# Patient Record
Sex: FEMALE | Race: WHITE | NOT HISPANIC OR LATINO | Employment: FULL TIME | URBAN - METROPOLITAN AREA
[De-identification: names, ages, dates, MRNs, and addresses within clinical notes are randomized per-mention and may not be internally consistent; named-entity substitution may affect disease eponyms.]

---

## 2019-11-04 ENCOUNTER — HISTORICAL (OUTPATIENT)
Dept: ADMINISTRATIVE | Facility: HOSPITAL | Age: 54
End: 2019-11-04

## 2019-11-06 LAB — FINAL CULTURE: NO GROWTH

## 2019-12-02 ENCOUNTER — HISTORICAL (OUTPATIENT)
Dept: ADMINISTRATIVE | Facility: HOSPITAL | Age: 54
End: 2019-12-02

## 2019-12-03 ENCOUNTER — HOSPITAL ENCOUNTER (OUTPATIENT)
Dept: MEDSURG UNIT | Facility: HOSPITAL | Age: 54
End: 2019-12-04
Attending: OBSTETRICS & GYNECOLOGY | Admitting: OBSTETRICS & GYNECOLOGY

## 2022-04-11 ENCOUNTER — HISTORICAL (OUTPATIENT)
Dept: ADMINISTRATIVE | Facility: HOSPITAL | Age: 57
End: 2022-04-11
Payer: MEDICAID

## 2022-04-25 DIAGNOSIS — R41.3 MEMORY CHANGE: Primary | ICD-10-CM

## 2022-04-28 VITALS
OXYGEN SATURATION: 98 % | WEIGHT: 190.25 LBS | BODY MASS INDEX: 30.58 KG/M2 | DIASTOLIC BLOOD PRESSURE: 71 MMHG | SYSTOLIC BLOOD PRESSURE: 103 MMHG | HEIGHT: 66 IN

## 2022-05-04 NOTE — HISTORICAL [facility] CERNER
This is a historical note converted from [employer]ner. Formatting and pictures may have been removed.  Please reference [employer]ner for original formatting and attached multimedia. Admit and Discharge Dates  Admit Date: 12/03/2019  Discharge Date: 12/04/2019  Physicians  Attending Physician - Pablo ZIMMER, May S  Admitting Physician - Justin Polanco MD  Discharge Diagnosis  Post-operative state  Surgical Procedures  12/03/2019 - [id number] - Colporrhaphy  Admission Information  Scheduled posterior colporrhaphy for posterior vaginal wall prolapse  Hospital Course  53yo s/p posterior colporrhaphy, POD#1, PMH of COPD, bipolar disorder and HTN. This morning she is doing well. Denies any pain. She took ibuprofen overnight and did not require narcotics. Tolerating a regular diet. Ambulating without difficulty. Vaginal packing removed, mildly saturated. Active voiding trial performed this morning was successful. Patient?is?stable for discharge to home today.  Objective  Vitals & Measurements  T:?36.5? ?C (Oral)? TMIN:?36.0? ?C (Oral)? TMAX:?36.8? ?C (Oral)? HR:?70(Peripheral)? RR:?18? BP:?102/66? SpO2:?98%?  Physical Exam  General Appearance: Alert, cooperative, no distress  Lungs: Respirations unlabored  Heart: Regular rate  Abdomen: Soft, non-distended, non-tender  Pelvic: normal appearing external genitalia, vaginal packing mildly saturated with dark brown blood. No active blood from vaginal vault.  Extremities: Extremities normal, atraumatic, symmetrical swelling in bilateral lower extremities, no erythema or calf tenderness  Skin: Skin turgor normal, no rashes or lesions.  Patient Discharge Condition  Good  Discharge Disposition  To home  ?  Norma Michele MD  [employer] [facility] PGY-2   Discharge Medication Reconciliation  Prescribed  conjugated estrogens topical (Premarin 0.625 mg/g vaginal cream with applicator)?1 amanda, VAG, Once a day (at bedtime)  ibuprofen (ibuprofen 600 mg oral tablet)?600 mg, Oral, q6hr  Continue  albuterol  (Ventolin HFA 90 mcg/inh inhalation aerosol)?1 puff(s), INH, Once, PRN as needed for wheezing  albuterol (albuterol 0.083% inhalation solution)?2.5 mg, INH, q4hr Resp, PRN as needed for wheezing  ascorbic acid (Vitamin C TR), Oral, Daily  ergocalciferol (Vitamin D), Oral  estradiol topical (Estrace Vaginal 0.1 mg/g vaginal cream)?1 gm, VAG, 3x/Wk  fluticasone-salmeterol (Advair Diskus 250 mcg-50 mcg inhalation powder), INH, As Indicated  multivitamin (Vitamin B Complex oral tablet)?1 tab(s), Oral, Daily  Follow up  Appointment Date: 12/16/2019 01:00:00 pm  Location: [facility]      I personally saw the patient on date of discharge and reviewed all associated vitals, labs and related events.? I saw and examined her with the team today and spent?5-10 minutes at bedside.? I discussed post op expectations, ambulation encouraged and to call us with any issues.? I confirmed that she has our clinic contact information.? All of her questions were answered.  Stable and meeting all criteria for discharge.? Has scheduled follow up as above.

## 2022-05-04 NOTE — HISTORICAL [facility] CERNER
This is a historical note converted from [employer]ch[employer]. Formatting and pictures may have been removed.  Please reference Cerchon for original formatting and attached multimedia. Chief Complaint  Presents for scheduled surgery  History of Present Illness  55yo female with posterior vaginal prolapse presents for scheduled posterior colporraphy and possible perineorraphy. No complaints this morning. Presents with  and two children who will drive her to and from surgery. Did not take meds this morning.  ?  ***********************************PRIOR H&P*******************************************  ?  ?  55yo  with rectocele presents for preop appointment for posterior colporraphy. S/p total abdominal hysterectomy, bilateral salpingoophorectomy?in  for what she reports as uterine cancer (further discussion sounds like cervical dysplasia vs. cancer). Had a previous bladder lift, likely a TVT. Reports occasional pain.  PMH COPD, from chemical exposure. No smoking history.  ?   OBHx:  x 6, sAB x 1  GYNHx: s/p hysterectomy, last pap smear 2015 [facility] (no HPV done). Last pap 2018 [facility] (no HPV done).  SocHx: lives with  and brother in law. Denies alcohol, tobacco, illicit drugs. 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  Review of Systems  Positive findings noted in bold:  Psychological ROS: negative for anxiety, behavioral disorder, depression  Endocrine ROS: negative for - hot flashes or mood swings  Breast ROS: negative for - nipple changes or nipple discharge, breast lumps  Respiratory ROS: negative for - cough,?shortness of breath  Cardiovascular ROS: negative for - edema, palpitations, chest pain  Gastrointestinal ROS: negative for - abdominal pain, appetite loss, blood in stools, constipation or diarrhea, nausea, vomiting  Genito-Urinary ROS: negative for - genital discharge, dysuria, frequency  Musculoskeletal ROS: negative for - gait disturbance, joint swelling  Neurological ROS: negative for - confusion or  dizziness  Dermatological ROS: negative for rashes, skin changes  Physical Exam  ??Vitals & Measurements  ??T:?36.9? ?C (Oral)? HR:?83(Peripheral)? RR:?20? BP:?153/97?  ??HT:?168?cm? WT:?85.8?kg? BMI:?30.4?  - General Appearance: Alert, cooperative, no distress, appears stated age  - Neck: no thyromegaly, no LAD  - Lungs: Clear to auscultation bilaterally, respirations unlabored  - Heart: Regular rate and rhythm, S1 and S2 normal, no murmur, rub or gallop  - Abdomen: Soft, non-tender, no masses, no organomegaly. Midline vertical incision  Genitalia: (from 11/14/19)  External genitalia: Normal female anatomy, no masses/lesions. Normal appearing urethral meatus. Normal appearing external anus. Bulge visible in vagina.  Sterile speculum exam: Atrophic vaginal mucosa,?pink,?normal in appearance. No masses/lesions. Vaginal cuff intact  Bimanual exam: Vagina with moderate capacity. Uterus surgically absent. No adnexal fullness/tenderness. Normal urethra. Normal bladder.  Rectovaginal exam: No rectal masses palpated. Rectovaginal septum thin, pouching up into vagina, no tenderness produced on exam.  ?   POP-Q  Aa -2  Ba -4  C -5  D n/a  Ap/Bp +2  GH 6  PB 3  TVL 6  ?   Extremities: Extremities normal, atraumatic, no cyanosis or edema  Skin: Skin turgor normal, no rashes or lesions.  ?  Assessment/Plan  1.?Rectocele?N81.6  ???- Rx sent for Premarin cream since she was unable to fill at her outside pharmacy  ?   [facility] appt requested for monday 11/2  No meds DOS  Labs today: T&S, has CBC/BMP from 5/2019 WNL.  Labs DOS: none  NPO after midnight day before  Will return to urogynecology clinic on Monday 11/2 to sign surgery consents with Dr. Polanco  ??Ordered:  estradiol topical, 1 gm, VAG, 3x/Wk, # 42.5 gm, 5 Refill(s), Pharmacy: [facility]  ?  Referrals  ?Clinic Follow up, *Est. 12/11/19 3:00:00 CST, Order for future visit, Rectocele  Review of Systems  Denies fevers chills, chest pain, shortness of breath,  nausea.  Physical Exam  Vitals & Measurements  T:?36.9? ?C (Oral)? HR:?70(Monitored)? RR:?16? BP:?123/71? SpO2:?100%? WT:?85.3?kg? BMI:?30.35?  Gen: No distress  CV: RRR, no murmurs  Lungs: CTAB  Abd: soft, nt/nd  Ext: no edema  Assessment/Plan  Orders:  Below the Knee Intermittent Pneumatic Compression Device, 12/03/19 5:11:00 CST, Constant Order  Clinic Follow up, *Est. 12/16/19 3:00:00 CST, Order for future visit, Posterior vaginal wall prolapse  MD to Nurse, 12/03/19 5:11:00 CST, If patient is on a beta blocker at home, give with small sip of water on morning of surgery  NPO, 12/03/19 5:11:00 CST, CM NPO  Place in Outpatient Observation, 12/03/19 5:11:00 CST, Ambulatory Surgery Kylee ZIMMER, Pallavi, No  Remove Compression Device, Inspect skin, Reapply, and Document Assessment, 12/03/19 5:11:00 CST, qShift  Vital Signs, 12/03/19 5:11:00 CST, Once, Stop date 12/03/19 5:11:00 CST  to OR for posterior colporraphy and possible perineorraphy   Problem List/Past Medical History  Ongoing  Ankle pain, left  Bipolar  Closed displaced trimalleolar fracture of left lower leg  COPD (chronic obstructive pulmonary disease)  Hypertension  Obesity  Historical  Pregnant  Pregnant  Pregnant  Pregnant  Pregnant  Pregnant  Procedure/Surgical History  Open treatment of trimalleolar ankle fracture, includes internal fixation, when performed, medial and/or lateral malleolus; without fixation of posterior lip (10/20/2015)  ORIF Ankle (Left) (10/20/2015)  Reposition Left Tibia with Internal Fixation Device, Open Approach (10/20/2015)  Incision and drainage of vulva or perineal abscess. (06/21/2014)  Other incision of vulva and perineum (06/21/2014)  bladder lift  Hysterectomy  nasal surgery  tubaligation   Medications  Inpatient  No active inpatient medications  Home  Advair Diskus 250 mcg-50 mcg inhalation powder, INH, As Indicated  albuterol 0.083% inhalation solution, 2.5 mg= 3 mL, INH, q4hr Resp, PRN  Estrace Vaginal 0.1 mg/g vaginal  cream, 1 gm, VAG, 3x/Wk, 5 refills,? ?Unable to obtain: Pt reports [facility] pharmacy does not have, plans on having RX sent to her [facility]  Ventolin HFA 90 mcg/inh inhalation aerosol, 1 puff(s), INH, Once, PRN  Vitamin B Complex oral tablet, 1 tab(s), Oral, Daily  Vitamin C TR, Oral, Daily  Vitamin D, Oral  Allergies  BENGAY Greaseless  Bengay Pain Relief+Massage  Watermelon  codeine  iodine topical  povidone iodine topical  Social History  Abuse/Neglect  No, No, Yes, 12/03/2019  No, 12/02/2019  Alcohol - Denies Alcohol Use, 06/21/2014  Current, 1-2 times per month, 10/09/2015  Employment/School  Employed, 06/15/2015  Home/Environment  Lives with Spouse., 06/15/2015  Nutrition/Health  Regular, 06/15/2015  Substance Use - Denies Substance Abuse, 06/21/2014  Never, 10/09/2015  Tobacco - Denies Tobacco Use, 06/21/2014  Never (less than 100 in lifetime), N/A, 12/03/2019  Never (less than 100 in lifetime), No, 05/22/2019  Family History  Breast cancer: Sister.  Colon cancer: Mother.  Throat cancer: Mother.      For posterior colporrhaphy

## 2022-05-04 NOTE — HISTORICAL [facility] CERNER
This is a historical note converted from [employer]chon. Formatting and pictures may have been removed.  Please reference Cerchon for original formatting and attached multimedia. Chief Complaint  referral rectodele  History of Present Illness  53yo presents with complaint of vaginal pressure, splinting with defecation, and dyspareunia. She has a h/o of hysterectomy with urethral mesh procedure for stress incontinence. She denies any urinary symptoms since that procedure. She does report some stool incontinence which is very distressing.  ?  +some dysuria today, thinks she may be developing UTI  Review of Systems  Negative for dizziness, weakness, recent unexplained weight loss  Negative for CP or SOB  Negative for cough or?wheezing  Negative for abdominal pain, nausea, vomiting, diarrhea or constipation  Negative for vaginal bleeding or abnormal vaginal discharge  Negative for dysuria, urinary frequency, or incontinence  Negative for swelling or pain in extremities  Physical Exam  Vitals & Measurements  T:?36.9? ?C (Oral)? RR:?18? BP:?140/86? SpO2:?99%?  HT:?168?cm? WT:?86.1?kg? BMI:?30.51?  General: NAD, A/Ox3  Neck: supple, full ROM  Respiratory: non-labored breathing, no cough or SOB  Cardiovascular: RRR, extremities well-perfused  Abdomen: soft, obese, nondistended, nontender to palpation, no masses palpated  Extremities: no edema, no calf tenderness  External genitalia: Normal female anatomy, no masses/lesions. Normal appearing urethral meatus. Normal appearing external anus. Bulge visible in vagina.  Sterile speculum exam: Atrophic vaginal mucosa,?pink,?normal in appearance. No masses/lesions. Vaginal cuff intact  Bimanual exam: Vagina with moderate capacity. Uterus surgically absent. No adnexal fullness/tenderness. Normal urethra. Normal bladder.  Rectovaginal exam: No rectal masses palpated. Rectovaginal septum thin, pouching up into vagina, no tenderness produced on exam.  ?   POP-Q  Aa -2  Ba -4  C -5  D n/a  Ap/Bp  +2  GH 6  PB 3  TVL 6  Assessment/Plan  1.?Rectocele?N81.6  Stage 3 prolapse of posterior wall, patient desires surgical management  Will need posterior repair  Pre-op on 12/4/2019  ?  2.?Dysuria?R30.0  ?Udip negative, will send urine for culture and call if abnormal  Ordered:  Urine Culture [id number], Routine collect, 11/04/19 14:59:00 CST, Urine, Catherized, Nurse collect, Stop date 11/04/19 14:59:00 CST, Dysuria  ?  Referrals  Clinic Follow up, *Est. 12/04/19 3:00:00 CST, pre-op visit with Dr. Pichardo, Order for future visit, Rectocele, [facility]   Problem List/Past Medical History  Ongoing  Ankle pain, left  Bipolar  Closed displaced trimalleolar fracture of left lower leg  COPD (chronic obstructive pulmonary disease)  Hypertension  Obesity  Historical  No qualifying data  Procedure/Surgical History  Open treatment of trimalleolar ankle fracture, includes internal fixation, when performed, medial and/or lateral malleolus; without fixation of posterior lip (10/20/2015)  ORIF Ankle (Left) (10/20/2015)  Reposition Left Tibia with Internal Fixation Device, Open Approach (10/20/2015)  Incision and drainage of vulva or perineal abscess. (06/21/2014)  Other incision of vulva and perineum (06/21/2014)  bladder lift  Hysterectomy  nasal surgery   Medications  baclofen 10 mg oral tablet, 10 mg= 1 tab(s), Oral, TID, PRN  Estrace Vaginal 0.1 mg/g vaginal cream, 1 gm, VAG, 3x/Wk, 5 refills  vitamin A,? ?Not taking  Vitamin B-12  Vitamin C TR, Oral, Daily  Vitamin D, Oral  Allergies  BENGAY Greaseless  Bengay Pain Relief+Massage  Watermelon  codeine  iodine topical  povidone iodine topical  Social History  Alcohol - Denies Alcohol Use, 06/21/2014  Current, 1-2 times per month, 10/09/2015  Employment/School  Employed, 06/15/2015  Home/Environment  Lives with Spouse., 06/15/2015  Nutrition/Health  Regular, 06/15/2015  Substance Use - Denies Substance Abuse, 06/21/2014  Never, 11/04/2019  Never, 10/09/2015  Tobacco - Denies  Tobacco Use, 06/21/2014  Never (less than 100 in lifetime), N/A, 11/04/2019  Never (less than 100 in lifetime), No, 05/22/2019  Never smoker, 10/09/2015  Family History  Breast cancer: Sister.  Colon cancer: Mother.  Throat cancer: Mother.  Health Maintenance  Health Maintenance  ???Pending?(in the next year)  ??? ??OverDue  ??? ? ? ?Diabetes Screening due??and every?  ??? ? ? ?Alcohol Misuse Screening due??01/01/19??and every 1??year(s)  ??? ??Due?  ??? ? ? ?ADL Screening due??11/05/19??and every 1??year(s)  ??? ? ? ?Breast Cancer Screening due??11/05/19??and every?  ??? ? ? ?COPD Maintenance-Spirometry due??11/05/19??and every?  ??? ? ? ?Colorectal Screening due??11/05/19??and every?  ??? ? ? ?Influenza Vaccine due??11/05/19??and every?  ??? ? ? ?Lipid Screening due??11/05/19??and every?  ??? ? ? ?Tetanus Vaccine due??11/05/19??and every 10??year(s)  ??? ??Due In Future?  ??? ? ? ?Obesity Screening not due until??01/01/20??and every 1??year(s)  ??? ? ? ?Hypertension Management-BMP not due until??05/22/20??and every 1??year(s)  ??? ? ? ?Blood Pressure Screening not due until??11/03/20??and every 1??year(s)  ??? ? ? ?Body Mass Index Check not due until??11/03/20??and every 1??year(s)  ??? ? ? ?Hypertension Management-Blood Pressure not due until??11/03/20??and every 1??year(s)  ???Satisfied?(in the past 1 year)  ??? ??Satisfied?  ??? ? ? ?Blood Pressure Screening on??11/04/19.??Satisfied by Candi Collins LPN  ??? ? ? ?Body Mass Index Check on??11/04/19.??Satisfied by Candi Collins LPN  ??? ? ? ?Diabetes Screening on??05/22/19.??Satisfied by Nilsa Mendes  ??? ? ? ?Hypertension Management-Blood Pressure on??11/04/19.??Satisfied by Candi Collins LPN  ??? ? ? ?Obesity Screening on??11/04/19.??Satisfied by Candi Collins LPN  ?      Constipation: counseled on diet, daily fiber.  ?  Vaginal atrophy: estrogen cream prescribed, counseled patient on use   Needs posterior colporrhaphy

## 2022-05-04 NOTE — HISTORICAL [facility] CERNER
This is a historical note converted from [employer]. Formatting and pictures may have been removed.  Please reference [employer]ner for original formatting and attached multimedia. Indication for Surgery  53yo with posterior vaginal prolapse presents for scheduled posterior colporrhaphy and possible perineorraphy  Preoperative Diagnosis  Posterior vaginal prolapse  Postoperative Diagnosis  Posterior vaginal prolapse  Operation  Posterior colporrhaphy, perineorraphy  Surgeon(s)  Dr. Justin Polanco, staff  Assistant  Dr. Bruce Dinh, Urogynecology Fellow  Dr. Pallavi Nair, HO3  Anesthesia  General endoetracheal  Estimated Blood Loss  20mL  Urine Output  Bahena placed at end of procedure  Findings  Posterior vaginal wall defect, approximately 4cm promixal extension.  Specimen(s)  None  Complications  None  Technique  Patient was taken to the OR with IV fluids running. She received 2g Ancef. SCDs were in place. General anesthesia was obtained without difficulty. She was placed in dorsal lithotomy. She was prepped and draped in the normal sterile fashion.  ?  Two allis clamps were placed on the postero-lateral wall of the distal vagina on either side of the midline. The vaginal wall between the clamps was incised with scalpel transversely. A third allis clamp was placed in the midline. Metzenbaums were used to separate the plane between the vaginal epithelium from the fibromuscular layer. The dissection continued cephalad and then the epithelium was incised?at the midline. The dissection was extended laterally to further separate the underlying fibromuscular layer from the epithelium. Rectal examination was performed at this point to palpate midline defect. Midline plication was performed next with a series of interrupted 2-0 vicryl sutures?between the vagina and perineal body along the length of the posterior vaginal wall. ?Digital rectal palpation was continued to ensure no sutures were placed in rectum. The defect was assessed  and found to be reapproximated well. Next, perineorrhaphy was performed in the usual fashion. 2-0 Vicryl suture was used to approximate the connective tissue surrounding the perineal muscles and superfiical transverse perineal muscles at the midline in a running fashion. A subcutaneous stitch was finally performed to reapproximate the skin. The hymenal ring was found to be reapproximated well at the conclusion of the procedure.  ?  Dr. Polanco was present and scrubbed for the entire procedure.   Posterior colporrhaphy defect type done

## 2022-06-06 PROBLEM — F31.9 BIPOLAR DISORDER: Status: ACTIVE | Noted: 2022-06-06

## 2022-06-06 PROBLEM — I10 HYPERTENSION: Status: ACTIVE | Noted: 2022-06-06

## 2022-06-06 PROBLEM — J44.9 CHRONIC OBSTRUCTIVE PULMONARY DISEASE: Status: ACTIVE | Noted: 2022-06-06

## 2022-06-06 PROBLEM — E66.9 OBESITY: Status: ACTIVE | Noted: 2022-06-06
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
## 2022-06-07 ENCOUNTER — OFFICE VISIT (OUTPATIENT)
Dept: NEUROLOGY | Facility: CLINIC | Age: 57
End: 2022-06-07
Payer: MEDICAID

## 2022-06-07 ENCOUNTER — LAB VISIT (OUTPATIENT)
Dept: LAB | Facility: HOSPITAL | Age: 57
End: 2022-06-07
Attending: PSYCHIATRY & NEUROLOGY
Payer: MEDICAID

## 2022-06-07 VITALS
RESPIRATION RATE: 18 BRPM | OXYGEN SATURATION: 99 % | HEART RATE: 70 BPM | WEIGHT: 186.75 LBS | BODY MASS INDEX: 30.01 KG/M2 | TEMPERATURE: 99 F | SYSTOLIC BLOOD PRESSURE: 136 MMHG | HEIGHT: 66 IN | DIASTOLIC BLOOD PRESSURE: 82 MMHG

## 2022-06-07 DIAGNOSIS — R41.3 MEMORY LOSS: ICD-10-CM

## 2022-06-07 DIAGNOSIS — F31.9 BIPOLAR AFFECTIVE DISORDER, REMISSION STATUS UNSPECIFIED: ICD-10-CM

## 2022-06-07 DIAGNOSIS — I10 PRIMARY HYPERTENSION: ICD-10-CM

## 2022-06-07 DIAGNOSIS — J44.9 CHRONIC OBSTRUCTIVE PULMONARY DISEASE, UNSPECIFIED COPD TYPE: ICD-10-CM

## 2022-06-07 DIAGNOSIS — R41.3 MEMORY LOSS: Primary | ICD-10-CM

## 2022-06-07 DIAGNOSIS — E66.9 OBESITY, UNSPECIFIED CLASSIFICATION, UNSPECIFIED OBESITY TYPE, UNSPECIFIED WHETHER SERIOUS COMORBIDITY PRESENT: ICD-10-CM

## 2022-06-07 DIAGNOSIS — G47.33 OSA (OBSTRUCTIVE SLEEP APNEA): ICD-10-CM

## 2022-06-07 DIAGNOSIS — R41.3 MEMORY CHANGE: ICD-10-CM

## 2022-06-07 LAB
ALBUMIN SERPL-MCNC: 4.3 GM/DL (ref 3.5–5)
ALBUMIN/GLOB SERPL: 1.4 RATIO (ref 1.1–2)
ALP SERPL-CCNC: 67 UNIT/L (ref 40–150)
ALT SERPL-CCNC: 26 UNIT/L (ref 0–55)
AST SERPL-CCNC: 24 UNIT/L (ref 5–34)
BASOPHILS # BLD AUTO: 0.02 X10(3)/MCL (ref 0–0.2)
BASOPHILS NFR BLD AUTO: 0.3 %
BILIRUBIN DIRECT+TOT PNL SERPL-MCNC: 0.4 MG/DL
BUN SERPL-MCNC: 14.1 MG/DL (ref 9.8–20.1)
CALCIUM SERPL-MCNC: 9.4 MG/DL (ref 8.4–10.2)
CHLORIDE SERPL-SCNC: 100 MMOL/L (ref 98–107)
CO2 SERPL-SCNC: 29 MMOL/L (ref 22–29)
CREAT SERPL-MCNC: 0.8 MG/DL (ref 0.55–1.02)
EOSINOPHIL # BLD AUTO: 0.02 X10(3)/MCL (ref 0–0.9)
EOSINOPHIL NFR BLD AUTO: 0.3 %
ERYTHROCYTE [DISTWIDTH] IN BLOOD BY AUTOMATED COUNT: 13.2 % (ref 11.5–17)
FOLATE SERPL-MCNC: 35.5 NG/ML (ref 7–31.4)
GLOBULIN SER-MCNC: 3.1 GM/DL (ref 2.4–3.5)
GLUCOSE SERPL-MCNC: 86 MG/DL (ref 74–100)
HCT VFR BLD AUTO: 44.3 % (ref 37–47)
HGB BLD-MCNC: 14 GM/DL (ref 12–16)
IMM GRANULOCYTES # BLD AUTO: 0.01 X10(3)/MCL (ref 0–0.02)
IMM GRANULOCYTES NFR BLD AUTO: 0.2 % (ref 0–0.43)
LYMPHOCYTES # BLD AUTO: 2.78 X10(3)/MCL (ref 0.6–4.6)
LYMPHOCYTES NFR BLD AUTO: 46 %
MCH RBC QN AUTO: 28.1 PG (ref 27–31)
MCHC RBC AUTO-ENTMCNC: 31.6 MG/DL (ref 33–36)
MCV RBC AUTO: 89 FL (ref 80–94)
MONOCYTES # BLD AUTO: 0.37 X10(3)/MCL (ref 0.1–1.3)
MONOCYTES NFR BLD AUTO: 6.1 %
NEUTROPHILS # BLD AUTO: 2.9 X10(3)/MCL (ref 2.1–9.2)
NEUTROPHILS NFR BLD AUTO: 47.1 %
NRBC BLD AUTO-RTO: 0 %
PLATELET # BLD AUTO: 225 X10(3)/MCL (ref 130–400)
PMV BLD AUTO: 10.6 FL (ref 9.4–12.4)
POTASSIUM SERPL-SCNC: 4 MMOL/L (ref 3.5–5.1)
PROT SERPL-MCNC: 7.4 GM/DL (ref 6.4–8.3)
RBC # BLD AUTO: 4.98 X10(6)/MCL (ref 4.2–5.4)
SODIUM SERPL-SCNC: 138 MMOL/L (ref 136–145)
T PALLIDUM AB SER QL: NONREACTIVE
T PALLIDUM AB SER QL: NORMAL
T4 FREE SERPL-MCNC: 1.03 NG/DL (ref 0.7–1.48)
TSH SERPL-ACNC: 1.85 UIU/ML (ref 0.35–4.94)
VIT B12 SERPL-MCNC: 874 PG/ML (ref 213–816)
WBC # SPEC AUTO: 6.1 X10(3)/MCL (ref 4.5–11.5)

## 2022-06-07 PROCEDURE — 84443 ASSAY THYROID STIM HORMONE: CPT

## 2022-06-07 PROCEDURE — 99204 PR OFFICE/OUTPT VISIT, NEW, LEVL IV, 45-59 MIN: ICD-10-PCS | Mod: S$PBB,,, | Performed by: PSYCHIATRY & NEUROLOGY

## 2022-06-07 PROCEDURE — 3008F PR BODY MASS INDEX (BMI) DOCUMENTED: ICD-10-PCS | Mod: CPTII,,, | Performed by: PSYCHIATRY & NEUROLOGY

## 2022-06-07 PROCEDURE — 99204 OFFICE O/P NEW MOD 45 MIN: CPT | Mod: S$PBB,,, | Performed by: PSYCHIATRY & NEUROLOGY

## 2022-06-07 PROCEDURE — 3079F PR MOST RECENT DIASTOLIC BLOOD PRESSURE 80-89 MM HG: ICD-10-PCS | Mod: CPTII,,, | Performed by: PSYCHIATRY & NEUROLOGY

## 2022-06-07 PROCEDURE — 3075F SYST BP GE 130 - 139MM HG: CPT | Mod: CPTII,,, | Performed by: PSYCHIATRY & NEUROLOGY

## 2022-06-07 PROCEDURE — 82746 ASSAY OF FOLIC ACID SERUM: CPT

## 2022-06-07 PROCEDURE — 3075F PR MOST RECENT SYSTOLIC BLOOD PRESS GE 130-139MM HG: ICD-10-PCS | Mod: CPTII,,, | Performed by: PSYCHIATRY & NEUROLOGY

## 2022-06-07 PROCEDURE — 82607 VITAMIN B-12: CPT

## 2022-06-07 PROCEDURE — 3079F DIAST BP 80-89 MM HG: CPT | Mod: CPTII,,, | Performed by: PSYCHIATRY & NEUROLOGY

## 2022-06-07 PROCEDURE — 86780 TREPONEMA PALLIDUM: CPT

## 2022-06-07 PROCEDURE — 36415 COLL VENOUS BLD VENIPUNCTURE: CPT

## 2022-06-07 PROCEDURE — 84439 ASSAY OF FREE THYROXINE: CPT

## 2022-06-07 PROCEDURE — 85025 COMPLETE CBC W/AUTO DIFF WBC: CPT

## 2022-06-07 PROCEDURE — 3008F BODY MASS INDEX DOCD: CPT | Mod: CPTII,,, | Performed by: PSYCHIATRY & NEUROLOGY

## 2022-06-07 PROCEDURE — 99213 OFFICE O/P EST LOW 20 MIN: CPT | Mod: PBBFAC | Performed by: PSYCHIATRY & NEUROLOGY

## 2022-06-07 PROCEDURE — 80053 COMPREHEN METABOLIC PANEL: CPT

## 2022-06-07 RX ORDER — HYDROCHLOROTHIAZIDE 12.5 MG/1
12.5 CAPSULE ORAL DAILY
COMMUNITY
Start: 2022-06-02

## 2022-06-07 RX ORDER — CYCLOSPORINE 0.5 MG/ML
EMULSION OPHTHALMIC
COMMUNITY
Start: 2022-02-07

## 2022-06-07 RX ORDER — ESCITALOPRAM OXALATE 5 MG/1
5 TABLET ORAL DAILY
COMMUNITY
Start: 2022-06-02

## 2022-06-07 RX ORDER — LORATADINE 10 MG/1
10 TABLET ORAL DAILY
COMMUNITY
Start: 2022-06-02

## 2022-06-07 RX ORDER — ESCITALOPRAM OXALATE 5 MG/1
TABLET ORAL
COMMUNITY

## 2022-06-07 RX ORDER — FLUTICASONE PROPIONATE 50 MCG
SPRAY, SUSPENSION (ML) NASAL
COMMUNITY
Start: 2022-06-02

## 2022-06-07 RX ORDER — CLOTRIMAZOLE AND BETAMETHASONE DIPROPIONATE 10; .64 MG/G; MG/G
CREAM TOPICAL
COMMUNITY
Start: 2022-04-20

## 2022-06-07 RX ORDER — LORATADINE 10 MG/1
TABLET ORAL
COMMUNITY
Start: 2021-06-06

## 2022-06-07 RX ORDER — CYCLOBENZAPRINE HCL 10 MG
TABLET ORAL
COMMUNITY
Start: 2022-05-02

## 2022-09-08 DIAGNOSIS — G47.33 OSA (OBSTRUCTIVE SLEEP APNEA): Primary | ICD-10-CM

## 2022-09-09 PROBLEM — R41.3 MEMORY LOSS: Status: ACTIVE | Noted: 2022-09-09
